# Patient Record
Sex: MALE | Race: WHITE | Employment: FULL TIME | URBAN - METROPOLITAN AREA
[De-identification: names, ages, dates, MRNs, and addresses within clinical notes are randomized per-mention and may not be internally consistent; named-entity substitution may affect disease eponyms.]

---

## 2018-11-26 ENCOUNTER — HOSPITAL ENCOUNTER (OUTPATIENT)
Dept: PHYSICAL THERAPY | Age: 48
Discharge: HOME | End: 2018-11-26
Payer: OTHER GOVERNMENT

## 2018-11-26 PROCEDURE — 97530 THERAPEUTIC ACTIVITIES: CPT

## 2018-11-26 PROCEDURE — 97112 NEUROMUSCULAR REEDUCATION: CPT

## 2018-11-26 PROCEDURE — 97162 PT EVAL MOD COMPLEX 30 MIN: CPT

## 2018-11-26 NOTE — PROGRESS NOTES
PT DAILY TREATMENT NOTE/VESTIBULAR EVAL 10-18 Patient Name: Enrico Kinney Date:2018 : 1970 [x]  Patient  Verified Payor: SYLVESTER / Plan: Phill Martin / Product Type: Yuli Oldraj / In time:2  Out time:310 Total Treatment Time (min): 70 Visit #: 1 of 8 Medicare/BCBS Only Total Timed Codes (min):  45 1:1 Treatment Time:  70 Treatment Area: Dizziness and giddiness [R42] SUBJECTIVE Pain Level (0-10 scale): 0 
[]constant []intermittent []improving []worsening []no change since onset Any medication changes, allergies to medications, adverse drug reactions, diagnosis change, or new procedure performed?: [x] No    [] Yes (see summary sheet for update) Subjective functional status/changes: vestibular testing  at [facility] and diagnosed with Mal D'embarquement Syndrome. No PT initiated at this time. Symptoms went away after about 9 months but have recently returned since beginning of 10/2018 with insidious onset. Flew back from [place] middle of September this year without symptoms but most recent flight to and back from [facility] [place]. Onset of mild symptoms after flight to location and exacerbation of dizziness after return flight. Current dizziness 3/10. Immediately after flight 8/10 . Symptoms feel like 'walking on a boat'. Occasional nausea. Triggers include elevated BP although patient denies high BP. Usually changing of position triggers dizziness. Acute episodes last about 5-10 min. Occasional feeling like falling. 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 Has to focus to fix cameras and feels like table is moving up/down. Reports lots of work stress that might be triggering dizziness. Dizziness/imbalance progresses as the day goes on. Reduction in frequency of dizziness overall but still with positional changes.  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 An it feels like my HR is up. \" Hx of chronic LBP  
PLOF: Full function Limitations to PLOF: WNL Mechanism of Injury: n/a Current symptoms/Complaints: intermittent dizziness, 'I work through it' Previous Treatment/Compliance: n/a PMHx/Surgical Hx: n/a Work Hx: [place] Living Situation:  With wife, no difficulty with stair climbing Pt Goals: stop dizziness and imbalance Barriers: []pain []financial []time []transportation []other Motivation: good Substance use: []Alcohol []Tobacco []other:  
FABQ Score: []low []elevate Cognition: A & O x 3    Other: OBJECTIVE/EXAMINATION Domestic Life: WNL Activity/Recreational Limitations: swimming 1 mile daily before work- symptoms are almost gone, helps with dizziness Mobility: WNL Self Care: WNL 20 min [x]Eval                  []Re-Eval  
 
 
 
 
25 min Therapeutic Activity:  [x]  See flow sheet :instruction in HEP, POC and vestibular system Rationale: improve balance  to improve the patients ability to return to PLOF 15 min Neuromuscular Re-education:  [x]  See flow sheet :initiation of gaze stability ex Rationale: improve balance  And gaze stability With 
 [] TE 
 [] TA 
 [] neuro 
 [] other: Patient Education: [x] Review HEP [] Progressed/Changed HEP based on:  
[] positioning   [] body mechanics   [] transfers   [] heat/ice application   
[] other:   
 
Other Objective/Functional Measures: as per evaluation Physical Therapy Evaluation - Vestibular Posture:  [] WNL  CS disc herniation   
[] Forward head    [] Protracted shoulders    [] Retracted shoulders 
[] Kyphosis:  [] increased   [] decreased  
[] Lordosis:   [] increased   [] decreased Other: C/S ROM: [] WFL    [x] Limited    Describe:no pain with functional AROM Strength: [x] [facility]    [] Limited    Describe: 
 
Optional Tests: 
Sensation:  [x] Intact [] Diminished    Describe: Proprioception: [x] Intact [] Diminished    Describe: 
 
Coordination Testing: 
     Disdiadochokinesia [x] WFL    [] Impaired    Describe: 
     Heel - Arce  [] WFL    [] Impaired    Describe: 
     FNF   [] WFL    [] Impaired    Describe: Toe Tap   [x] [facility]    [] Impaired    Describe: 
 
Oculomotor Tests: (Fixation Not Blocked) Ocular ROM:   [x] [facility]    [] Limited    Describe:ROM/symmetry WNL, mild dizziness with repeated eye movements Spontaneous Nystag. [x] Neg     [] Pos    [] Left    [] Right Gaze Holding Nystag. [x] Neg     [] Pos    [] Left    [] Right Smooth Pursuit  [x] Neg     [] Pos    [] Left    [] Right Saccades   [x] Neg     [] Pos    [] Left    [] Right VOR - Slow Head Mvmt [x] Neg     [] Pos    [] Left    [] Right , subjective dizziness VOR - Fast Head Mvmt [] Neg     [] Pos    [] Left    [] Right , subjective dizziness Head Thrust  [x] Neg     [] Pos    [] Left    [] Right Static Visual Acuity [x] Neg     [] Pos    [] Left    [] Right Dynamic Visual Acuity [x] Neg     [] Pos    [] Left    [] Right Oculomotor Tests: (Fixation Blocked) Spontaneous Nystag. [x] Neg     [] Pos    [] Left    [] Right Gaze Holding Nystag. [x] Neg     [] Pos    [] Left    [] Right Head-Shaking Nystag. [x] Neg     [] Pos    [] Left    [] Right Other Special Tests: 
     Vertebral Artery Testing [x] Neg     [] Pos    [] Left    [] Right Hallpike-Tiera Maneuver [x] Neg     [] Pos    [] Left    [] Right Roll Test   [x] Neg     [] Pos    [] Left    [] Right Avilez Balance Scale [] Neg     [] Pos    Score: 
     Dynamic Gait Index [] Neg     [] Pos    Score: 
     Functional Gait Assess. [] Neg     [] Pos    Score: 
 
Balance Standard Testing (Eyes Open/Eyes Closed - EO/EC) Romberg   [x] [facility]    [] Pos    Describe:     
     Stand on Foam  [] WFL    [] Pos    Describe: Standing on Rail  [] WFL    [] Pos    Describe:  
     Sharpened Romberg [] WFL    [x] Pos    Describe: (+) sway EO, > 5\" EC Single Leg Stand  [] [facility]    [x] Pos    Describe: 2\" max EC Motion Sensitivity:  [x] Neg     [] Pos    Describe: 
 
Computerized Dynamic Posturography:  
     [x] Not Tested    [] [facility]    Score:no results available Other Tests: 
 
  
 
Pain Level (0-10 scale) post treatment: 0 
 
ASSESSMENT/Changes in Function: good tolerance to activities Patient will continue to benefit from skilled PT services to address imbalance/dizziness to attain remaining goals. []  See Plan of Care 
[]  See progress note/recertification 
[]  See Discharge Summary Progress towards goals / Updated goals: 
Good understanding of initial HEP 
 
PLAN 
[]  Upgrade activities as tolerated     [x]  Continue plan of care 
[]  Update interventions per flow sheet      
[]  Discharge due to:_ 
[]  Other:_ Brandon Davis PT 11/26/2018  2:04 PM

## 2018-11-26 NOTE — PROGRESS NOTES
[facility] 
[address], [address] [phone]  Fx [phone] Plan of Care/ Statement of Necessity for Physical Therapy Services Patient name: Luis Kothari Start of Care: 2018 Referral source: José Carlson NP : 1970 Medical Diagnosis: Dizziness and giddiness [R42] Onset Date:beginning 10/2018 Treatment Diagnosis: balance /vestibular dysfunction Prior Hospitalization: see medical history Provider#: [id number] Medications: Verified on Patient summary List  
 Comorbidities: arthritis, hearing/visual impairment Prior Level of Function: full function without dizziness/balance deficit Subjective functional status/changes: vestibular testing 2016 at [facility] and diagnosed with Mal D'embarquement Syndrome. No PT initiated at this time. Symptoms went away after about 9 months but have recently returned since beginning of 10/2018 with insidious onset. Flew back from [place] middle of September this year without symptoms but most recent flight to and back from [facility] [place]. Onset of mild symptoms after flight to location and exacerbation of dizziness after return flight. Current dizziness 3/10. Immediately after flight 8/10 . Symptoms feel like 'walking on a boat'. Occasional nausea. Triggers include elevated BP although patient denies high BP. Usually changing of position triggers dizziness. Acute episodes last about 5-10 min. Occasional feeling like falling. 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 Reports lots of work stress that might be triggering dizziness. Dizziness/imbalance progresses as the day goes on. Reduction in frequency of dizziness overall but still with positional changes. 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  An it feels like my HR is up. \" Hx of chronic LBP The Plan of Care and following information is based on the information from the initial evaluation. Assessment/ key information: 50 YOM with recent onset of acute imbalance/dizziness with unknown etiology. Patient reports that he was diagnosed in 2016 with'  Mal de Debarquement' after returning from a flight. Symptoms gradually reduced over a 9 months period and patient reports not having had any issues until recently. Current symptoms started [place] and increased after the return flight including nausea, dizziness (currently 3/10, post flight 8/10). Patient describes 'feeling of walking on a boat' and it being triggered by high BP/HR. Also a change of position and walking through hallways at work triggers dizziness. Evaluation Complexity History MEDIUM  Complexity : 1-2 comorbidities / personal factors will impact the outcome/ POC ; Examination MEDIUM Complexity : 3 Standardized tests and measures addressing body structure, function, activity limitation and / or participation in recreation  ;Presentation MEDIUM Complexity : Evolving with changing characteristics  ; Clinical Decision Making MEDIUM Complexity : FOTO score of 26-74 Overall Complexity Rating: MEDIUM Problem List: impaired gait/ balance Treatment Plan may include any combination of the following: Therapeutic exercise, Therapeutic activities, Neuromuscular re-education, Gait/balance training and Patient education Patient / Family readiness to learn indicated by: trying to perform skills and interest 
Persons(s) to be included in education: patient (P) Barriers to Learning/Limitations:no Patient Goal (s): less dizziness Patient Self Reported Health Status: good Rehabilitation Potential: good Short Term Goals: To be accomplished in 4 weeks: 1. Patient is compliant with HEP 3x daily to assure improvement in function and reduction in dizziness Status at Eval: HEP initiated 2. Patient reports reduction in dizziness with ADL by >or= 25% for increased function Status at Eval: fluctuating levels of dizziness ashwin with work related focusing and walking hallways Long Term Goals: To be accomplished in 4 weeks: 1. Improved FOTO score to >or= 82/100  points as evidence of improved function Status at [facility]: FOTO 56/100 2. Reduction in [facility] [address] to <or= 30 points as evidence of improved dizziness with ADL Status at [facility]: [address] [place] [address] pending 3. Patient is independent with advanced vestibular HEP and tapering to assure optimal progression after discharge Status at Eval: patient education Frequency / Duration: Patient to be seen 1 times per week for 8 weeks. Patient/ Caregiver education and instruction: Diagnosis, prognosis, exercises 
 [x]  Plan of care has been reviewed with REYNA 280Noreen Bazzi, PT 11/26/2018 3:17 PM 
 
________________________________________________________________________ I certify that the above Therapy Services are being furnished while the patient is under my care. I agree with the treatment plan and certify that this therapy is necessary. [de-identified] Signature:____________Date:_________TIME:________ 
 
[employer], Date and Time must be completed for valid certification ** Please sign and return to [facility] 
[address]mel, [address] [phone]  Fx [phone]

## 2018-12-06 ENCOUNTER — HOSPITAL ENCOUNTER (OUTPATIENT)
Dept: PHYSICAL THERAPY | Age: 48
Discharge: HOME | End: 2018-12-06
Payer: OTHER GOVERNMENT

## 2018-12-06 PROCEDURE — 97112 NEUROMUSCULAR REEDUCATION: CPT

## 2018-12-06 NOTE — PROGRESS NOTES
PT DAILY TREATMENT NOTE Patient Name: Madalyn George Date:2018 : 1970 [x]  Patient  Verified Payor: SYLVESTER / Plan: Aleja Martinez / Product Type: Yvette Rockers / In time:145  Out time:225 Total Treatment Time (min): 40 Total Timed Codes (min): 40 
1:1 Treatment Time ( only): n/a Visit #: 2 of 8 Treatment Area: Dizziness and giddiness [R42] SUBJECTIVE Pain Level (0-10 scale): 0/10, dizziness 3/10 Any medication changes, allergies to medications, adverse drug reactions, diagnosis change, or new procedure performed?: [x] No    [] Yes (see summary sheet for update) Subjective functional status/changes:   [] No changes reported Patient reports incident of increased dizziness and 'jumping' last Thursday when performing balance ex (SLS/SR). He left work to call ENT. Stayed home to rest for one day. Took vestibular meds. PCP visit that Friday- referral to Neuro. currently waiting for appointment. States reduction in dizziness symptoms since using eye focus ex but has stopped doing HEP after acute episode OBJECTIVE 
 
 
 min Therapeutic Exercise:  [] See flow sheet :  
 
 
 min Therapeutic Activity:  []  See flow sheet :  
 
  
40 min Neuromuscular Re-education:  [x]  See flow sheet :progression of vestibular ex, balance and HEP, patient education about importance to comply with HEP in order to gain progress in vestibular function Rationale: improve balance  to improve the patients ability to return to PLOF With 
 [] TE 
 [] TA 
 [] neuro 
 [] other: Patient Education: [x] Review HEP [] Progressed/Changed HEP based on:  
[] positioning   [] body mechanics   [] transfers   [] heat/ice application   
[] other:   
 
Other Objective/Functional Measures:  
no increase in dizziness with today's activities Marked sway with ex EC and SR Pain Level (0-10 scale) post treatment: 0, dizziness 3/10, unchanged ASSESSMENT/Changes in Function: improved ease to walking long hallways at work Patient will continue to benefit from skilled PT services to address imbalance/dizziness to attain remaining goals. []  See Plan of Care 
[]  See progress note/recertification 
[]  See Discharge Summary Progress towards goals / Updated goals: 
 
Short Term Goals: To be accomplished in 4 weeks: 1. Patient is compliant with HEP 3x daily to assure improvement in function and reduction in dizziness Status at [facility]: HEP initiated Current status: stopped ex after acute episode, patient education about how to modify HEP in case of increased symptoms 
  
2. Patient reports reduction in dizziness with ADL by >or= 25% for increased function Status at [facility]: fluctuating levels of dizziness ashwin with work related focusing and walking hallways 
  
Long Term Goals: To be accomplished in 4 weeks: 1. Improved FOTO score to >or= 82/100  points as evidence of improved function Status at [facility]: FOTO 56/100 
  
2. Reduction in [address] to <or= 30 points as evidence of improved dizziness with ADL Status at [facility]: [facility] pending 
  
3. Patient is independent with advanced vestibular HEP and tapering to assure optimal progression after discharge Status at Eval: patient education 
  
Frequency / Duration: Patient to be seen 1 times per week for 8 weeks. 
  
Patient/ Caregiver education and instruction: Diagnosis, prognosis, exercises 
 [x]  Plan of care has been reviewed with PTA PLAN 
[]  Upgrade activities as tolerated     [x]  Continue plan of care 
[]  Update interventions per flow sheet      
[]  Discharge due to:_ 
[]  Other:_ Anjali Dhaliwal, PT 12/6/2018  1:16 PM 
 
Future Appointments Date Time Provider Becky Barnard 12/6/2018  2:00 PM Charles Strickland PT [facility]  
12/12/2018  5:00 PM Charles Strickland PT [facility]  
12/19/2018  3:00 PM Charles Strickland PT [facility]

## 2018-12-12 ENCOUNTER — APPOINTMENT (OUTPATIENT)
Dept: PHYSICAL THERAPY | Age: 48
End: 2018-12-12
Payer: OTHER GOVERNMENT

## 2018-12-19 ENCOUNTER — HOSPITAL ENCOUNTER (OUTPATIENT)
Dept: PHYSICAL THERAPY | Age: 48
Discharge: HOME | End: 2018-12-19
Payer: OTHER GOVERNMENT

## 2018-12-19 PROCEDURE — 97112 NEUROMUSCULAR REEDUCATION: CPT

## 2018-12-19 PROCEDURE — 97530 THERAPEUTIC ACTIVITIES: CPT

## 2018-12-20 NOTE — PROGRESS NOTES
[facility] at [address], [address]  Ph [phone]  Fx [phone]    Physical Therapy Progress Note  Patient name: Susan Beasley Start of Care: 2018   Referral source: Horace Cuevas NP : 1970                Medical Diagnosis: Dizziness and giddiness [R42]    Onset Date:beginning 10/2018                Treatment Diagnosis: balance Si Easter dysfunction   Prior Hospitalization: see medical history Provider#: [id number]   Medications: Verified on Patient summary List    Comorbidities: arthritis, hearing/visual impairment   Prior Level of Function: full function without dizziness/balance deficit          Visits from Start of Care: 3    Missed Visits: 0      Short Term Goals: To be accomplished in 4 weeks:               1. Patient is compliant with HEP 3x daily to assure improvement in function and reduction in dizziness  Status at [facility]: HEP initiated  Current status: patient compliant with vestibular ex program and reports reduction in dizziness with ADL, progressing     2. Patient reports reduction in dizziness with ADL by >or= 25% for increased function  Status at [facility]: fluctuating levels of dizziness ashwin with work related focusing and walking hallways  Current status: reduction in dizziness reported, progressing     Long Term Goals: To be accomplished in 4 weeks:               1. Improved FOTO score to >or= 82/100  points as evidence of improved function  Status at [facility]: FOTO 56/100  Current status: to be tested on visit #5     2. Reduction in [address] to <or= 30 points as evidence of improved dizziness with ADL  Status at [facility]: [address] pending     3. Patient is independent with advanced vestibular HEP and tapering to assure optimal progression after discharge  Status at [facility]: patient education    Key Functional Changes: reduction in dizziness with ADL    ASSESSMENT/RECOMMENDATIONS:Mr. Edgardo Vinson has been showing progress in function and reduction in dizziness.  I recommend continuation and progression of current treatment to address remaining goals.    [x]Continue therapy per initial plan/protocol at a frequency of  1 x per week for 8 weeks  []Continue therapy with the following recommended changes:_____________________      _____________________________________________________________________  []Discontinue therapy progressing towards or have reached established goals  []Discontinue therapy due to lack of appreciable progress towards goals  []Discontinue therapy due to lack of attendance or compliance  []Await Physician's recommendations/decisions regarding therapy  []Other:________________________________________________________________    Thank you for this referral.   Minal Mims, PT 12/19/2018 11:30 PM    NOTE TO PHYSICIAN:  PLEASE COMPLETE THE ORDERS BELOW AND   [facility]: [phone]  If you are unable to process this request in 24 hours please contact our office: [phone]      ____ I have read the above report and request that my patient continue therapy with the following changes/special instructions:  ____ I have read the above report and request that my patient be discharged from therapy    Physician's Signature:____________Date:_________TIME:________    ** Signature, Date and Time must be completed for valid certification **

## 2018-12-20 NOTE — PROGRESS NOTES
PT DAILY TREATMENT NOTE    Patient Name: Noemi Mosher  Date:2018  : 1970  [x]  Patient  Verified  Payor:  / Plan: [place]au 74 / Product Type:  /    In time:3  Out time:340  Total Treatment Time (min): 40  Total Timed Codes (min): 40  1:1 Treatment Time ([facility] only): n/a    Visit #: 3 of 8    Treatment Area: Dizziness and giddiness [R42]    SUBJECTIVE  Pain Level (0-10 scale): 0/10,   Any medication changes, allergies to medications, adverse drug reactions, diagnosis change, or new procedure performed?: [x] No    [] Yes (see summary sheet for update)  Subjective functional status/changes:   [] No changes reported  Reports less dizziness when doing eye/ex. ENT visit last week with recommendation to continue PT. Cat scan ordered for 18. Neurologist visit 18, order for MRI, date pending (Dr Kendra Alcocer)    OBJECTIVE         min Therapeutic Exercise:  [] See flow sheet :       15 min Therapeutic Activity:  [x]  See flow sheet :review and update of HEP, discussed POC        25 min Neuromuscular Re-education:  [x]  See flow sheet :progression of vestibular ex, balance    Rationale: improve balance  to improve the patients ability to return to PLOF              With   [] TE   [] TA   [] neuro   [] other: Patient Education: [x] Review HEP    [] Progressed/Changed HEP based on:   [] positioning   [] body mechanics   [] transfers   [] heat/ice application    [] other:      Other Objective/Functional Measures:   no increase in dizziness with today's activities  (+) sway with ex EC and SR    /83    Pain Level (0-10 scale) post treatment: 0, dizziness 3/10, unchanged    ASSESSMENT/Changes in Function: improved ease to walking long hallways at work    Patient will continue to benefit from skilled PT services to address imbalance/dizziness to attain remaining goals.      [x]  See Plan of Care  []  See progress note/recertification  []  See Discharge Summary         Progress towards goals / Updated goals:    Short Term Goals: To be accomplished in 4 weeks:               1. Patient is compliant with HEP 3x daily to assure improvement in function and reduction in dizziness  Status at [facility]: HEP initiated  Current status: patient compliant with vestibular ex program and reports reduction in dizziness with ADL, progressing     2. Patient reports reduction in dizziness with ADL by >or= 25% for increased function  Status at [facility]: fluctuating levels of dizziness ashwin with work related focusing and walking hallways  Current status: reduction in dizziness reported, progressing     Long Term Goals: To be accomplished in 4 weeks:               1. Improved FOTO score to >or= 82/100  points as evidence of improved function  Status at [facility]: FOTO 56/100  Current status: to be tested on visit #5     2. Reduction in [address] [place] [address] Street to <or= 30 points as evidence of improved dizziness with ADL  Status at [facility]: [address] pending     3.  Patient is independent with advanced vestibular HEP and tapering to assure optimal progression after discharge  Status at [facility]: patient education     Frequency / Duration: Patient to be seen 1 times per week for 8 weeks.     Patient/ Caregiver education and instruction: Diagnosis, prognosis, exercises   [x]  Plan of care has been reviewed with PTA        PLAN  []  Upgrade activities as tolerated     [x]  Continue plan of care  []  Update interventions per flow sheet       []  Discharge due to:_  []  Other:_      Lester Neri PT 12/19/2018  1:16 PM    Future Appointments   Date Time Provider Becky Barnard   12/31/2018  5:30 PM Cassie Reaves PT [facility]   1/16/2019  4:30 PM Cassie Reaves PT [facility]

## 2018-12-31 ENCOUNTER — HOSPITAL ENCOUNTER (OUTPATIENT)
Dept: PHYSICAL THERAPY | Age: 48
Discharge: HOME | End: 2018-12-31
Payer: OTHER GOVERNMENT

## 2018-12-31 PROCEDURE — 97110 THERAPEUTIC EXERCISES: CPT

## 2018-12-31 PROCEDURE — 97112 NEUROMUSCULAR REEDUCATION: CPT

## 2018-12-31 NOTE — PROGRESS NOTES
PT DAILY TREATMENT NOTE Patient Name: Fer Hu Date:2018 : 1970 [x]  Patient  Verified Payor: SYLVESTER / Plan: Kassi Gonzales / Product Type: Luciano Munch / In time:510  Out time:555 Total Treatment Time (min): 45 Total Timed Codes (min): 45 
1:1 Treatment Time (MC only): n/a Visit #: 4 of 8 Treatment Area: Dizziness and giddiness [R42] SUBJECTIVE Pain Level (0-10 scale): 0/10, Any medication changes, allergies to medications, adverse drug reactions, diagnosis change, or new procedure performed?: [x] No    [] Yes (see summary sheet for update) Subjective functional status/changes:   [] No changes reported Overall less dizziness. Scan last week. Results pending. Waiting to be scheduled for MRI Reduction of dizziness 50% Doing HEP mostly 2x daily, difficulty finding spot at work OBJECTIVE 10 min Therapeutic Exercise:  [x] See flow sheet :  
 
 
 min Therapeutic Activity:  [x]  See flow sheet :  
 
  
25 min Neuromuscular Re-education:  [x]  See flow sheet :progression of vestibular ex, balance, initiated [place] ex Rationale: improve balance  to improve the patients ability to return to PLOF With 
 [] TE 
 [] TA 
 [] neuro 
 [] other: Patient Education: [x] Review HEP [] Progressed/Changed HEP based on:  
[] positioning   [] body mechanics   [] transfers   [] heat/ice application   
[] other:   
 
Other Objective/Functional Measures:  
Mild increase in dizziness with VSE/VVI, short break to get back to base line Pain Level (0-10 scale) post treatment: 0, dizziness 3/10, unchanged ASSESSMENT/Changes in Function: improved ease to walking long hallways at work Patient will continue to benefit from skilled PT services to address imbalance/dizziness to attain remaining goals. [x]  See Plan of Care 
[]  See progress note/recertification 
[]  See Discharge Summary Progress towards goals / Updated goals: Short Term Goals: To be accomplished in 4 weeks: 1. Patient is compliant with HEP 3x daily to assure improvement in function and reduction in dizziness Status at Eval: HEP initiated Current status: patient compliant with vestibular ex program and reports reduction in dizziness with ADL, progressing 
  
2. Patient reports reduction in dizziness with ADL by >or= 25% for increased function Status at Eval: fluctuating levels of dizziness ashwin with work related focusing and walking hallways Current status: reduction in dizziness reported, progressing 
  
Long Term Goals: To be accomplished in 4 weeks: 1. Improved FOTO score to >or= 82/100  points as evidence of improved function Status at [facility]: FOTO 56/100 Current status: to be tested on visit #5 
  
2. Reduction in [address] [place] [address] Street to <or= 30 points as evidence of improved dizziness with ADL Status at [facility]: [facility] [address] pending 
  
3. Patient is independent with advanced vestibular HEP and tapering to assure optimal progression after discharge Status at Eval: patient education 
  
Frequency / Duration: Patient to be seen 1 times per week for 8 weeks. 
  
Patient/ Caregiver education and instruction: Diagnosis, prognosis, exercises 
 [x]  Plan of care has been reviewed with PTA PLAN 
[]  Upgrade activities as tolerated     [x]  Continue plan of care 
[]  Update interventions per flow sheet      
[]  Discharge due to:_ 
[]  Other:_ Hayde Carlson PT 12/31/2018  1:16 PM 
 
Future Appointments Date Time Provider Becky Barnard 12/31/2018  5:30 PM Luis Giles PT [facility]  
1/16/2019  4:30 PM Luis Giles PT [facility]

## 2019-01-09 ENCOUNTER — APPOINTMENT (OUTPATIENT)
Dept: PHYSICAL THERAPY | Age: 49
End: 2019-01-09
Payer: OTHER GOVERNMENT

## 2019-01-16 ENCOUNTER — HOSPITAL ENCOUNTER (OUTPATIENT)
Dept: PHYSICAL THERAPY | Age: 49
Discharge: HOME | End: 2019-01-16
Payer: OTHER GOVERNMENT

## 2019-01-16 PROCEDURE — 97530 THERAPEUTIC ACTIVITIES: CPT

## 2019-01-16 PROCEDURE — 97112 NEUROMUSCULAR REEDUCATION: CPT

## 2019-01-16 NOTE — PROGRESS NOTES
PT DAILY TREATMENT NOTE Patient Name: Ravi Ness Date:2019 : 1970 [x]  Patient  Verified Payor: SYLVESTER / Plan: Adela Wagner / Product Type: Kenzie Mahtomedi / In time:445  Out time:505 Total Treatment Time (min): 20 Total Timed Codes (min): 20 
1:1 Treatment Time (MC only): n/a Visit #: 5 of 8 Treatment Area: Dizziness and giddiness [R42] SUBJECTIVE Pain Level (0-10 scale): 0/10, Any medication changes, allergies to medications, adverse drug reactions, diagnosis change, or new procedure performed?: [x] No    [] Yes (see summary sheet for update) Subjective functional status/changes:   [] No changes reported 'It has kind of gone away now. I don't remember the last time I had an episode. 95% reduction of dizziness Has reduced doing his vestibular ex Ready for d/c MRI last week of brain, results pending Cat scan of head (-) OBJECTIVE 10 min Therapeutic Activity:  [x]  See flow sheet :reevaluation  
r 
  
10 min Review of tapering of Vvestibular Rationale: improve balance  to improve the patients ability to return to PLOF With 
 [] TE 
 [] TA 
 [] neuro 
 [] other: Patient Education: [x] Review HEP [] Progressed/Changed HEP based on:  
[] positioning   [] body mechanics   [] transfers   [] heat/ice application   
[] other:   
 
Other Objective/Functional Measures:  
No dizziness SR EO and EC for 10\" SLS EO 10\", EC 10\" with signs of imbalance DHI 14 FOTO 93 
95% improvement Good understanding of tapering of HEP Pain Level (0-10 scale) post treatment: 0, no dizziness ASSESSMENT/Changes in Function: return to all ADL without dizziness [x]  See Discharge Summary Progress towards goals / Updated goals: 
 
Short Term Goals: To be accomplished in 4 weeks: 1. Patient is compliant with HEP 3x daily to assure improvement in function and reduction in dizziness Status at Eval: HEP initiated Current status: patient compliant with vestibular ex program and reports reduction in dizziness with ADL, progressing Status at d/c: patient reports reduced vestibular ex as he has not had any dizziness for about 10 days, goal met 
  
2. Patient reports reduction in dizziness with ADL by >or= 25% for increased function Status at [facility]: fluctuating levels of dizziness ashwin with work related focusing and walking hallways Current status: reduction in dizziness reported, progressing Status at d/: reduction by 95%, goal met 
  
Long Term Goals: To be accomplished in 4 weeks: 1. Improved FOTO score to >or= 82/100  points as evidence of improved function Status at [facility]: FOTO 56/100 Current status: to be tested on visit #5 Status at /:93, goal met 
  
2. Reduction in [address] [place] [address] to <or= 30 points as evidence of improved dizziness with ADL Status at [facility]: [facility] [address] Status at /:14, goal met 
  
3. Patient is independent with advanced vestibular HEP and tapering to assure optimal progression after discharge Status at [facility]: patient education Status at /: independent with tapering of vestibular HEP, goal met 
  
Frequency / Duration: Patient to be seen 1 times per week for 8 weeks. 
  
 
 
 
 
PLAN [x]  Discharge due to:all goals being met_ 
[]  Other:_ Bobbi Lincoln, PT 1/16/2019  1:16 PM 
 
No future appointments.

## 2019-01-17 NOTE — PROGRESS NOTES
[facility] 
[address], [address] [facility] Ph [phone]  Fx [phone] Physical Therapy Discharge Summary Patient name: Jorge Berg [facility] of Care: 2018 Referral source: Zarina Martinez NP : 1970               
Medical Diagnosis: Dizziness and giddiness [R42] 
  Onset Date:beginning 10/2018               
Treatment Diagnosis: balance /vestibular dysfunction Prior Hospitalization: see medical history Provider#: [id number] Medications: Verified on Patient summary List  
 Comorbidities: arthritis, hearing/visual impairment 
 Prior Level of Function: full function without dizziness/balance deficit 
  
 
 
 
 
Visits from Start of Care: 5    Missed Visits: 0 Reporting Period : 18 to 19 Summary of Care:vestibular rehab, HEP, patient education Short Term Goals: To be accomplished in 4 weeks: 1. Patient is compliant with HEP 3x daily to assure improvement in function and reduction in dizziness Status at [facility]: HEP initiated Current status: patient compliant with vestibular ex program and reports reduction in dizziness with ADL, progressing Status at d/c: patient reports reduced vestibular ex as he has not had any dizziness for about 10 days, goal met 
  
2. Patient reports reduction in dizziness with ADL by >or= 25% for increased function Status at [facility]: fluctuating levels of dizziness ashwin with work related focusing and walking hallways Current status: reduction in dizziness reported, progressing Status at d/c: reduction by 95%, goal met 
  
Long Term Goals: To be accomplished in 4 weeks: 1. Improved FOTO score to >or= 82/100  points as evidence of improved function Status at [facility]: FOTO 56/100 Current status: to be tested on visit #5 Status at d/c:93, goal met 
  
2. Reduction in [address] to <or= 30 points as evidence of improved dizziness with ADL Status at [facility]: [address] Status at d/c:14, goal met   
3. Patient is independent with advanced vestibular HEP and tapering to assure optimal progression after discharge Status at Eval: patient education Status at d/c: independent with tapering of vestibular HEP, goal met ASSESSMENT/RECOMMENDATIONS:patient has met all goals and returned to all function without dizziness [x]Discontinue therapy: [x]Patient has reached or is progressing toward set goals []Patient is non-compliant or has abdicated 
    []Due to lack of appreciable progress towards set goals Bobbi Lincoln, PT 1/16/2019 10:56 PM

## 2020-12-24 ENCOUNTER — HOSPITAL ENCOUNTER (EMERGENCY)
Age: 50
Discharge: HOME | End: 2020-12-24
Attending: EMERGENCY MEDICINE
Payer: OTHER GOVERNMENT

## 2020-12-24 ENCOUNTER — APPOINTMENT (OUTPATIENT)
Dept: GENERAL RADIOLOGY | Age: 50
End: 2020-12-24
Attending: EMERGENCY MEDICINE
Payer: OTHER GOVERNMENT

## 2020-12-24 VITALS
HEIGHT: 71 IN | RESPIRATION RATE: 18 BRPM | SYSTOLIC BLOOD PRESSURE: 130 MMHG | HEART RATE: 80 BPM | DIASTOLIC BLOOD PRESSURE: 88 MMHG | BODY MASS INDEX: 25.2 KG/M2 | TEMPERATURE: 98.2 F | OXYGEN SATURATION: 98 % | WEIGHT: 180 LBS

## 2020-12-24 DIAGNOSIS — S86.891A PATELLAR TENDON AVULSION, RIGHT, INITIAL ENCOUNTER: Primary | ICD-10-CM

## 2020-12-24 DIAGNOSIS — S89.91XA INJURY OF RIGHT KNEE, INITIAL ENCOUNTER: ICD-10-CM

## 2020-12-24 PROCEDURE — 73564 X-RAY EXAM KNEE 4 OR MORE: CPT

## 2020-12-24 PROCEDURE — 99284 EMERGENCY DEPT VISIT MOD MDM: CPT

## 2020-12-25 NOTE — DISCHARGE INSTRUCTIONS
Your evaluation today shows that you have likely suffered a patellar tendon rupture. You will need to continue wearing the knee immobilizer and use crutches and be nonweightbearing on the right leg. He will need to see an orthopedic surgeon for likely need of MRI of the knee as well as surgical repair.

## 2020-12-25 NOTE — ED NOTES
Discharge instructions provided to patient. Verbalized understanding. Alert and oriented. Ambulated with crutches out of ED.

## 2020-12-25 NOTE — ED PROVIDER NOTES
EMERGENCY DEPARTMENT HISTORY AND PHYSICAL EXAM    Date: 12/24/2020  Patient Name: Randall Rivera    History of Presenting Illness     Chief Complaint   Patient presents with    Knee Injury         History Provided By: Patient and EMS    Randall Rivera is a 48 y.o. male who presents to the emergency department C/O right knee pain. EMS reports that the patient was dancing tonight when he felt like his knee popped out of place. They state upon their arrival the patient was limping but still ambulatory. Patient states he was unable to bend his knee on the right side. States he noticed some swelling but otherwise is not in that much pain. He states he is a retired     PCP: Unknown, Provider        Past History     Past Medical History:  No past medical history on file. Past Surgical History:  No past surgical history on file. Family History:  No family history on file. Social History:  Social History     Tobacco Use    Smoking status: Not on file   Substance Use Topics    Alcohol use: Not on file    Drug use: Not on file       Allergies:  No Known Allergies      Review of Systems   Review of Systems   Constitutional: Negative for fever. Respiratory: Negative for shortness of breath. Cardiovascular: Negative for chest pain. Gastrointestinal: Negative for abdominal pain. Musculoskeletal: Positive for arthralgias, gait problem, joint swelling and myalgias. All other systems reviewed and are negative. All other systems reviewed and are negative.     Physical Exam     Vitals:    12/24/20 2244 12/24/20 2300 12/24/20 2331   BP: (!) 142/91 (!) 137/90 130/88   Pulse: 85  80   Resp: 18  18   Temp: 98.2 °F (36.8 °C)  98.2 °F (36.8 °C)   SpO2: 99% 98% 98%   Weight: 81.6 kg (180 lb)     Height: 5' 11\" (1.803 m)       Physical Exam    Nursing notes and vital signs reviewed    Airway: intact, speaking normally  Breathing: No apparent distress, no cyanosis  Circulation: Peripheral pulses equal    Constitutional: Non toxic appearing, no acute distress, appearing stated age  [de-identified]:  Head: Normocephalic, Atraumatic  Eyes: PERRL, EOMI, No conjunctival injection  Ears: external ears normal  Nose: No rhinorrhea, external nose normal  Throat: mucous membranes moist  Neck: symmetric, trachea midline, no obvious swelling, no JVD  Cardiovascular: Regular rate and rhythm, no murmurs  Lungs: Clear to ausculation bilaterally, No stridor, Normal work of breathing and chest excursion bilaterally  Abdomen: Soft, non tender, non distended, normoactive bowel sounds, No rigidity, no peritoneal signs  Musculoskeletal: There is a noticeable palpable laxity to the patellar tendon on the right side with a high riding patella. Patient is unable to extend his right leg. No evidence of obvious deformity to the back, neck or left there is mild soft tissue swelling over the right knee. Extremities, no LE edema  Skin: Warm, dry, No obvious rashes  Neuro: Alert and oriented x 3, CN 2-12 intact, normal speech, strength and sensation full and symmetric bilaterally  Psychiatric: Normal mood and affect      Diagnostic Study Results     Labs -   No results found for this or any previous visit (from the past 72 hour(s)). Radiologic Studies -   XR KNEE RT MIN 4 V    (Results Pending)     CT Results  (Last 48 hours)    None        CXR Results  (Last 48 hours)    None          Medications given in the ED-  Medications - No data to display      Medical Decision Making     I reviewed the vital signs, available nursing notes, past medical history, past surgical history, family history and social history. Vital Signs interpretation- I have reviewed the patient's vital signs.     Pulse Oximetry interpretation - 99% on Room air     Cardiac Monitor interpretation:  Rate: 85 bpm  Rhythm: sinus      Records Reviewed: Nursing Notes and Old Medical Records    Procedures:  Procedures    ED Course & MDM:   Risk stratification: Based on the patient's physical exam he likely has suffered a patellar tendon rupture. Will obtain x-ray and put the patient in a knee immobilizer and give crutches. Data review: X-ray shows no bony process although there is high riding patella    Problem follow up:  I discussed with the patient that her symptoms are likely due to a patellar tendon rupture. I recommended to continue with nonweightbearing and knee immobilizer on the right leg and crutches. I stated he will need to follow-up with an orthopedic surgeon for likely need for MRI and surgical repair. Patient states that he is a  and can follow-up through [facility] at [facility]. Recommended to take NSAIDs and rice therapy for discomfort    Diagnosis and Disposition         DISCHARGE NOTE:    Kimo Ambriz  results have been reviewed with him. He has been counseled regarding his diagnosis, treatment, and plan. He verbally conveys understanding and agreement of the signs, symptoms, diagnosis, treatment and prognosis and additionally agrees to follow up as discussed. He also agrees with the care-plan and conveys that all of his questions have been answered. I have also provided discharge instructions for him that include: educational information regarding their diagnosis and treatment, and list of reasons why they would want to return to the ED prior to their follow-up appointment, should his condition change. He has been provided with education for proper emergency department utilization. CLINICAL IMPRESSION:    1. Patellar tendon avulsion, right, initial encounter    2. Injury of right knee, initial encounter        PLAN:  1. D/C Home  2. There are no discharge medications for this patient.     3.   Follow-up Information     Follow up With Specialties Details Why Contact Info    [facility]  Schedule an appointment as soon as possible for a visit  Schedule an appointment with your primary care physician as well as orthopedic surgeon [phone] _______________________________      Please note that this dictation was completed with [employer], the computer voice recognition software. Quite often unanticipated grammatical, syntax, homophones, and other interpretive errors are inadvertently transcribed by the computer software. Please disregard these errors. Please excuse any errors that have escaped final proofreading.